# Patient Record
(demographics unavailable — no encounter records)

---

## 2025-01-24 NOTE — PHYSICAL EXAM
[de-identified] : - Constitutional: This is a female in no obvious distress.  - Psych: Patient is alert and oriented to person, place and time.  Patient has a normal mood and affect. - Cardiovascular: Normal pulses throughout the upper extremities.  No significant varicosities are noted in the upper extremities. - Neuro: Strength and sensation are intact throughout the upper extremities.  Patient has normal coordination. - Respiratory:  Patient exhibits no evidence of shortness of breath or difficulty breathing. - Skin: No rashes, lesions, or other abnormalities are noted in the upper extremities. --- Examination of her left hand demonstrates a Dupuytren's nodule along the middle finger ray.  There is no tenderness.  There is no contracture.  She has full flexion and extension of the digits.  She has what appears to be a ganglion cyst emanating from the index finger MCP joint dorsally and radially.  There is mild tenderness.  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  She is neurovascularly intact distally.

## 2025-01-24 NOTE — END OF VISIT
[FreeTextEntry3] : This note was written by Uriel Muñiz on 01/24/2025 acting solely as a scribe for Dr. Vj Pendleton.   All medical record entries made by the Scribe were at my, Dr. Vj Pendleton, direction and personally dictated by me on 01/24/2025. I have personally reviewed the chart and agree that the record accurately reflects my personal performance of the history, physical exam, assessment and plan.

## 2025-01-24 NOTE — DISCUSSION/SUMMARY
[FreeTextEntry1] : She has findings consistent with a small left hand Dupuytren's nodule without a contracture and the recent development of a ganglion cyst emanating from the radial aspect of the left index finger MCP joint.   I had a discussion with the patient regarding today's visit, the prognosis of this diagnosis, and treatment recommendations and options.  With regard to the left index finger MCP joint ganglion cyst, we discussed treatment options.  She agreed to proceed with an attempted aspiration, knowing that it may recur.  With regard to her left middle finger Dupuytren's nodule, I recommended observation.  If it increases in size or she develops a contracture, then she will return to the office.   The patient has agreed to the above plan of management and has expressed full understanding. All questions were fully answered to the patient's satisfaction.   My cumulative time spent on this visit included: Preparation for the visit, review of the medical records, review of pertinent diagnostic studies, examination and counseling of the patient on the above diagnosis, treatment plan and prognosis, orders of diagnostic tests, medication and/or appropriate procedures and documentation in the medical records of today's visit.

## 2025-01-24 NOTE — ADDENDUM
[FreeTextEntry1] :  I, Uriel Muñiz, acted solely as a scribe for Dr. Pendleton on this date on 01/24/2025.

## 2025-01-24 NOTE — HISTORY OF PRESENT ILLNESS
[Right] : right hand dominant [FreeTextEntry1] : She comes in today for evaluation of a bump along the palm of her left middle finger. She also reports a cyst on her left index finger and denies any numbness/tingling.

## 2025-01-24 NOTE — PROCEDURE
[FreeTextEntry1] : Using sterile technique, the left index finger MCP joint ganglion cyst was aspirated. She was instructed on activity modification. She understands the relatively high risk of recurrence. If it recurs and causes her symptoms, then she will return to the office to discuss further treatment options.